# Patient Record
Sex: MALE | Race: WHITE | NOT HISPANIC OR LATINO | URBAN - METROPOLITAN AREA
[De-identification: names, ages, dates, MRNs, and addresses within clinical notes are randomized per-mention and may not be internally consistent; named-entity substitution may affect disease eponyms.]

---

## 2020-01-23 ENCOUNTER — APPOINTMENT (RX ONLY)
Dept: URBAN - METROPOLITAN AREA CLINIC 16 | Facility: CLINIC | Age: 45
Setting detail: DERMATOLOGY
End: 2020-01-23

## 2020-01-23 DIAGNOSIS — L50.1 IDIOPATHIC URTICARIA: ICD-10-CM

## 2020-01-23 PROBLEM — L30.9 DERMATITIS, UNSPECIFIED: Status: ACTIVE | Noted: 2020-01-23

## 2020-01-23 PROCEDURE — ? TREATMENT REGIMEN

## 2020-01-23 PROCEDURE — ? COUNSELING

## 2020-01-23 PROCEDURE — ? DIAGNOSIS COMMENT

## 2020-01-23 PROCEDURE — 11104 PUNCH BX SKIN SINGLE LESION: CPT

## 2020-01-23 PROCEDURE — ? BIOPSY BY PUNCH METHOD

## 2020-01-23 ASSESSMENT — LOCATION DETAILED DESCRIPTION DERM
LOCATION DETAILED: LEFT LATERAL UPPER BACK
LOCATION DETAILED: LEFT VENTRAL DISTAL FOREARM
LOCATION DETAILED: LEFT MID-UPPER BACK
LOCATION DETAILED: RIGHT VENTRAL DISTAL FOREARM

## 2020-01-23 ASSESSMENT — LOCATION ZONE DERM
LOCATION ZONE: ARM
LOCATION ZONE: TRUNK

## 2020-01-23 ASSESSMENT — LOCATION SIMPLE DESCRIPTION DERM
LOCATION SIMPLE: LEFT UPPER BACK
LOCATION SIMPLE: RIGHT FOREARM
LOCATION SIMPLE: LEFT FOREARM

## 2020-01-23 NOTE — PROCEDURE: BIOPSY BY PUNCH METHOD
Post-Care Instructions: I reviewed with the patient in detail post-care instructions. Patient is to keep the biopsy site dry overnight, and then apply bacitracin twice daily until healed. Patient may apply hydrogen peroxide soaks to remove any crusting.
Additional Anesthesia Volume In Cc (Will Not Render If 0): 0
Detail Level: Simple
Home Suture Removal Text: Patient was provided a home suture removal kit and will remove their sutures at home.  If they have any questions or difficulties they will call the office.
Suture Removal: 14 days
Bill For Surgical Tray: no
Anesthesia Volume In Cc (Will Not Render If 0): 1.5
Consent: Written consent was obtained and risks were reviewed including but not limited to scarring, infection, bleeding, scabbing, incomplete removal, nerve damage and allergy to anesthesia.
Wound Care: Petrolatum
Punch Size In Mm: 4
Biopsy Type: H and E
Epidermal Sutures: 4-0 Nylon
Notification Instructions: Patient will be notified of biopsy results. However, patient instructed to call the office if not contacted within 2 weeks.
Path Notes Override (Will Replace All Of The Above Text): 3 week history of edematous pink papules and arcuate patches, lasting > 24 hours per patient
Render Path Notes In Note?: Yes
Hemostasis: None
Anesthesia Type: 1% lidocaine with 1:100,000 epinephrine
Lab: 6
Dressing: bandage
Billing Type: Third-Party Bill

## 2020-01-23 NOTE — HPI: RASH
How Severe Is Your Rash?: mild
Is This A New Presentation, Or A Follow-Up?: Rash
Additional History: Patient went to the urgent care center [facility] and was put on prednisone 10 mg. Hives went away for a couple days but then came back even after taking the prednisone and allergy medication. Patient woke up a couple days later with a swollen face then went to PCPs office and then he gave him Kenalog shot. Then a couple days later the hives came back then the other side of his face was swollen with hives again but worse than ever it has ever been. Patient then went to [facility] and sent him home with prednisone again with allergy medication.

## 2020-01-23 NOTE — PROCEDURE: TREATMENT REGIMEN
Detail Level: Simple
Initiate Treatment: Complete current course of oral prednisone \\nStart Zyrtec (cetirizine). Take 20mg (2 tablets) in the morning. If doing well (not too drowsy) and still getting hives, then after a few days, increase to 20mg in the morning and 10mg in the evening. If doing well after a few additional days (not too drowsy) and still getting hives, then increase to 20mg in the morning and 20mg in the evening. \\n\\nContinue Benadryl 50mg before bedtime.\\n\\nAvoid aspirin or non-steroidal anti-inflammatories (eg ibuprofen) if possible

## 2020-01-23 NOTE — PROCEDURE: DIAGNOSIS COMMENT
Detail Level: Simple
Comment: Favor acute urticaria (onset 3 weeks ago), no clear inciting trigger (no recent illnesses, no new medications). Currently on a course of prednisone and on Pepcid. Discussed adding antihistamine therapy as below. Pending results of biopsy, lab work-up, and response to treatment, other treatment options to consider include low pseudoallergen diet, addition of montelukast and/or doxepin, sulfasalazine, colchicine, omalizumab, cyclosporine, MTX, mycophenolate mofetil. \\n\\nDenies angioedema, shortness of breath, difficulty breathing

## 2020-02-05 ENCOUNTER — APPOINTMENT (RX ONLY)
Dept: URBAN - METROPOLITAN AREA CLINIC 16 | Facility: CLINIC | Age: 45
Setting detail: DERMATOLOGY
End: 2020-02-05

## 2020-02-05 DIAGNOSIS — Z48.02 ENCOUNTER FOR REMOVAL OF SUTURES: ICD-10-CM

## 2020-02-05 DIAGNOSIS — L50.1 IDIOPATHIC URTICARIA: ICD-10-CM | Status: IMPROVED

## 2020-02-05 PROCEDURE — ? TREATMENT REGIMEN

## 2020-02-05 PROCEDURE — ? COUNSELING

## 2020-02-05 PROCEDURE — 99213 OFFICE O/P EST LOW 20 MIN: CPT

## 2020-02-05 PROCEDURE — ? ORDER TESTS

## 2020-02-05 PROCEDURE — ? SUTURE REMOVAL (NO GLOBAL PERIOD)

## 2020-02-05 PROCEDURE — ? DIAGNOSIS COMMENT

## 2020-02-05 ASSESSMENT — LOCATION ZONE DERM
LOCATION ZONE: TRUNK
LOCATION ZONE: ARM

## 2020-02-05 ASSESSMENT — LOCATION SIMPLE DESCRIPTION DERM
LOCATION SIMPLE: LEFT UPPER BACK
LOCATION SIMPLE: LEFT FOREARM
LOCATION SIMPLE: RIGHT FOREARM

## 2020-02-05 ASSESSMENT — LOCATION DETAILED DESCRIPTION DERM
LOCATION DETAILED: RIGHT VENTRAL DISTAL FOREARM
LOCATION DETAILED: LEFT LATERAL UPPER BACK
LOCATION DETAILED: LEFT VENTRAL DISTAL FOREARM
LOCATION DETAILED: LEFT SUPERIOR UPPER BACK

## 2020-02-05 NOTE — PROCEDURE: TREATMENT REGIMEN
Continue Regimen: Continue Zyrtec (cetirizine). Increase dose from 20mg (2 tablets) in the morning to 20mg in the morning and 20mg in the evening\\n\\nIf still getting hives on Zyrtec 20mg twice daily, then restart Benadryl 50mg before bedtime.\\n\\nAvoid aspirin or non-steroidal anti-inflammatories (eg ibuprofen) if possible
Detail Level: Simple

## 2020-02-05 NOTE — PROCEDURE: DIAGNOSIS COMMENT
Detail Level: Simple
Comment: Acute urticaria (onset about 5 weeks ago), no clear inciting trigger (no recent illnesses, no new medications), biopsy-proven, no evidence of urticarial vasculitis. \\n\\nDoing much better on Zyrtec 20mg in the AM - patient reports much less hives but is still breaking out. Recommend increasing dose of Zyrtec to 20mg BID. Also discussed adding montelukast, doxepin but patient prefers to start with increasing dose of Zyrtec first. \\n\\nLab work-up as below. Other treatment options to consider include low pseudoallergen diet, addition of montelukast and/or doxepin, sulfasalazine, colchicine, omalizumab, cyclosporine, MTX, mycophenolate mofetil. \\n\\nDenies angioedema, shortness of breath, difficulty breathing